# Patient Record
(demographics unavailable — no encounter records)

---

## 2025-01-27 NOTE — IMAGING
[de-identified] : Left hand No ecchymosis, swelling or wounds Normal muscle tone/ bulk Triggering is observed in the long finger TTP at A1 pulley of the long finger Able to make a full composite fist +AIN/ PIN/ Ulnar n SILT throughout fingers wwp  3 views left middle finger: No acute fractures or malalignment

## 2025-01-27 NOTE — HISTORY OF PRESENT ILLNESS
[de-identified] : 1/27/25: The patient returns today for repeat evaluation of his left middle trigger finger.  He received a CSI roughly 7 months ago with complete resolution of his pain and clicking until 5 weeks ago.  Today he notes significant stiffness and painful clicking.  He has many questions about additional treatment options.  Of note, the patients last A1c in August was 10.7 although he believes it is much better today.    06/24/2024 MOODY GAYLE is a 68 year old male here today for:  Location: left middle finger  Complaint: The patient presents to the office today with complaints of left middle finger locking and triggering.  He notes insidious onset 2 months ago.  He has been splinting the finger which seems to help.  No injuries that he can recall, numbness or tingling. Symptom onset: 2 months ago Prior treatments: none Hand Dominance: right Occupation: retired -   PMH: diabetes type 2 (per report now well controlled, previously A1c 10.7), HTN Allergies: none

## 2025-01-27 NOTE — DISCUSSION/SUMMARY
[de-identified] : - discussed the etiology/ pathophysiology of the patient's complaints today in layman's terms - reviewed treatment options, conservative and operative as well as the indications for each - discussed conservative treatment options including the role for anti-inflammatory medications, injections, bracing and therapy - reviewed operative treatment including trigger finger release and postoperative expectations - reviewed risks, benefits and alternatives to these - specifically reviewed increased risk of wound complications, infection and delayed healing due to his poorly controlled diabetes - with all of this in mind the patient would like to proceed with left middle trigger finger release - we will obtain a new A1c prior to that time and recommend delayed operative treatment until his blood glucose control is improved - NSAIDs prn pain - f/u after blood work   My cumulative time spent on this patient's visit included: Preparation for the visit, review of the medical records, review of pertinent diagnostic studies, examination and counseling of the patient on the above diagnosis, treatment plan and prognosis, orders of diagnostic tests, medications and/or appropriate procedures and documentation in the medical records of today's visit.

## 2025-03-18 NOTE — IMAGING
[de-identified] : Left hand No ecchymosis, swelling or wounds Normal muscle tone/ bulk Triggering is observed in the long finger TTP at A1 pulley of the long finger Stiff middle finger PIP motion Able to make a loose composite fist +AIN/ PIN/ Ulnar n SILT throughout fingers wwp  3 views left middle finger: No acute fractures or malalignment

## 2025-03-18 NOTE — HISTORY OF PRESENT ILLNESS
[de-identified] : 3/17/25: The patient returns today for repeat evaluation of his left middle trigger finger.  He is increasingly bothered by his triggering and stiffness.  He is interested in a repeat CSI.    06/24/2024 MOODY GAYLE is a 68 year old male here today for:  Location: left middle finger  Complaint: The patient presents to the office today with complaints of left middle finger locking and triggering.  He notes insidious onset 2 months ago.  He has been splinting the finger which seems to help.  No injuries that he can recall, numbness or tingling. Symptom onset: 2 months ago Prior treatments: none Hand Dominance: right Occupation: retired -   PMH: diabetes type 2 (per report now well controlled, previously A1c 10.7), HTN Allergies: none

## 2025-03-18 NOTE — DISCUSSION/SUMMARY
[de-identified] : - discussed the etiology/ pathophysiology of each of the patient's complaints today in layman's terms - reviewed treatment options, conservative and operative as well as the indications for each - discussed conservative treatment options including the role for anti-inflammatory medications, injections, bracing and therapy - reviewed operative treatment including trigger finger release and postoperative expectations - reviewed risks, benefits and alternatives to these - patient requests left middle trigger finger CSI, tolerated without complication - finger range of motion exercises demonstrated - the importance of improved blood glucose control were reiterated  - NSAIDs prn pain - f/u as needed   My cumulative time spent on this patient's visit included: Preparation for the visit, review of the medical records, review of pertinent diagnostic studies, examination and counseling of the patient on the above diagnosis, treatment plan and prognosis, orders of diagnostic tests, medications and/or appropriate procedures and documentation in the medical records of today's visit.  20 minutes were spent on this encounter

## 2025-03-18 NOTE — IMAGING
[de-identified] : Left hand No ecchymosis, swelling or wounds Normal muscle tone/ bulk Triggering is observed in the long finger TTP at A1 pulley of the long finger Stiff middle finger PIP motion Able to make a loose composite fist +AIN/ PIN/ Ulnar n SILT throughout fingers wwp  3 views left middle finger: No acute fractures or malalignment

## 2025-03-18 NOTE — HISTORY OF PRESENT ILLNESS
[de-identified] : 3/17/25: The patient returns today for repeat evaluation of his left middle trigger finger.  He is increasingly bothered by his triggering and stiffness.  He is interested in a repeat CSI.    06/24/2024 MOODY GAYLE is a 68 year old male here today for:  Location: left middle finger  Complaint: The patient presents to the office today with complaints of left middle finger locking and triggering.  He notes insidious onset 2 months ago.  He has been splinting the finger which seems to help.  No injuries that he can recall, numbness or tingling. Symptom onset: 2 months ago Prior treatments: none Hand Dominance: right Occupation: retired -   PMH: diabetes type 2 (per report now well controlled, previously A1c 10.7), HTN Allergies: none

## 2025-03-18 NOTE — PROCEDURE
[FreeTextEntry1] : Corticosteroid injection [FreeTextEntry2] : Left middle trigger finger [FreeTextEntry3] : The risks and benefits of steroid injections were discussed. Verbal consent was obtained, The site was prepped in a sterile fashion with alcohol A combination of 40 mg (1cc) of Kenalog and 2cc 1% xylocaine were injected under sterile conditions at the level of the left middle finger A1 pulley Patient tolerated the procedure without complication and was counseled on post injection expectations.

## 2025-03-18 NOTE — DISCUSSION/SUMMARY
[de-identified] : - discussed the etiology/ pathophysiology of each of the patient's complaints today in layman's terms - reviewed treatment options, conservative and operative as well as the indications for each - discussed conservative treatment options including the role for anti-inflammatory medications, injections, bracing and therapy - reviewed operative treatment including trigger finger release and postoperative expectations - reviewed risks, benefits and alternatives to these - patient requests left middle trigger finger CSI, tolerated without complication - finger range of motion exercises demonstrated - the importance of improved blood glucose control were reiterated  - NSAIDs prn pain - f/u as needed   My cumulative time spent on this patient's visit included: Preparation for the visit, review of the medical records, review of pertinent diagnostic studies, examination and counseling of the patient on the above diagnosis, treatment plan and prognosis, orders of diagnostic tests, medications and/or appropriate procedures and documentation in the medical records of today's visit.  20 minutes were spent on this encounter